# Patient Record
Sex: FEMALE | Race: WHITE
[De-identification: names, ages, dates, MRNs, and addresses within clinical notes are randomized per-mention and may not be internally consistent; named-entity substitution may affect disease eponyms.]

---

## 2017-11-15 ENCOUNTER — HOSPITAL ENCOUNTER (OUTPATIENT)
Dept: HOSPITAL 75 - PREOP | Age: 42
Discharge: HOME | End: 2017-11-15
Attending: PODIATRIST
Payer: COMMERCIAL

## 2017-11-15 VITALS — BODY MASS INDEX: 43.6 KG/M2 | HEIGHT: 62.5 IN | WEIGHT: 243 LBS

## 2017-11-15 VITALS — SYSTOLIC BLOOD PRESSURE: 123 MMHG | DIASTOLIC BLOOD PRESSURE: 84 MMHG

## 2017-11-15 DIAGNOSIS — M72.2: ICD-10-CM

## 2017-11-15 DIAGNOSIS — Z01.818: Primary | ICD-10-CM

## 2017-11-15 PROCEDURE — 87081 CULTURE SCREEN ONLY: CPT

## 2017-11-15 NOTE — HISTORY AND PHYSICAL
DATE OF SERVICE:  



DATE OF ADMISSION:

11/17/2017.



CHIEF COMPLAINT:

"Have right foot pain, I have plantar fasciitis, want it corrected, Dr. Mejia

to do it this Friday."



ALLERGIC TO MEDICATIONS:

SULFA AND ____.



MEDICATIONS:

Now on has a list, Prilosec, ibuprofen, daily fiber, vitamin E, melatonin, baby

aspirin, bupropion SR, Celexa 20 mg, breathing treatments, ipratropium bromide

and albuterol sulfate, albuterol inhaler p.r.n., gabapentin 300 mg a day,

Augmentin for a bite, animal.



PAST SURGICAL HISTORY:

Open heart, holes in it; complete hysterectomy.  The patient states she saw 

____ last Thursday at [place], [place] and told it was

okay to have surgery.



FAMILY HISTORY:

Dad, diabetes and heart disease.  Denies asthma, TB, lung disease or cancer.



REVIEW OF SYSTEMS:

HEAD:  Denies headache, dizziness.

EYES, EARS, NOSE AND THROAT:  Denies diplopia, tinnitus, sore throat.

RESPIRATORY:  The patient states she has asthma.  The patient states she never

smoked.  "I get a cough."  Denies short of breath or wheezing at this moment.

HEART:  No history of heart problems or chest pain.  Just checked out by

cardiologist last week.

GASTROINTESTINAL:  Appetite good.  Denies blood in the stools, diarrhea,

constipation, nausea or vomiting.

GENITOURINARY:  Denies blood, pain or frequency.



PHYSICAL EXAMINATION:

GENERAL:  The patient is a white female, well nourished, well developed, in no

acute respiratory distress at rest.

VITAL SIGNS:  Weight 241, blood pressure 120/80, pulse 84.

EARS:  No drainage noted.

EYES:  No conjunctivitis or icterus.

THROAT:  Noninflamed.  Tonsils removed.

NECK:  Thyroid not enlarged.  No abnormal cervical lymphadenopathy noted.

HEART:  Regular rate and rhythm.

LUNGS:  Clear to auscultation.

ABDOMEN:  Soft.  Liver and spleen nonpalpable.

LOWER EXTREMITIES:  Good dorsalis pedis pulses.



The patient is okay to have surgery, will be on standby if has any problems.





Job ID: [id number]

DocumentID: [id number]

Dictated Date:  11/15/2017 15:18:15

Transcription Date: 11/15/2017 16:43:22

Dictated By: BRANDY ALEXANDRE DO

## 2017-11-17 ENCOUNTER — HOSPITAL ENCOUNTER (OUTPATIENT)
Dept: HOSPITAL 75 - SDC | Age: 42
Discharge: HOME | End: 2017-11-17
Attending: PODIATRIST
Payer: COMMERCIAL

## 2017-11-17 VITALS — SYSTOLIC BLOOD PRESSURE: 111 MMHG | DIASTOLIC BLOOD PRESSURE: 75 MMHG

## 2017-11-17 VITALS — SYSTOLIC BLOOD PRESSURE: 108 MMHG | DIASTOLIC BLOOD PRESSURE: 74 MMHG

## 2017-11-17 VITALS — WEIGHT: 243 LBS | HEIGHT: 62.5 IN | BODY MASS INDEX: 43.6 KG/M2

## 2017-11-17 VITALS — DIASTOLIC BLOOD PRESSURE: 75 MMHG | SYSTOLIC BLOOD PRESSURE: 113 MMHG

## 2017-11-17 VITALS — DIASTOLIC BLOOD PRESSURE: 74 MMHG | SYSTOLIC BLOOD PRESSURE: 108 MMHG

## 2017-11-17 VITALS — SYSTOLIC BLOOD PRESSURE: 125 MMHG | DIASTOLIC BLOOD PRESSURE: 84 MMHG

## 2017-11-17 DIAGNOSIS — J45.909: ICD-10-CM

## 2017-11-17 DIAGNOSIS — I10: ICD-10-CM

## 2017-11-17 DIAGNOSIS — M41.9: ICD-10-CM

## 2017-11-17 DIAGNOSIS — K21.9: ICD-10-CM

## 2017-11-17 DIAGNOSIS — E66.01: ICD-10-CM

## 2017-11-17 DIAGNOSIS — E78.2: ICD-10-CM

## 2017-11-17 DIAGNOSIS — Z79.899: ICD-10-CM

## 2017-11-17 DIAGNOSIS — M72.2: Primary | ICD-10-CM

## 2017-11-17 NOTE — DISCHARGE INSTRUCTIONS
Discharge Instructions


Discharge Medications


New, Converted or Re-Newed RX:  RX Given to Pt/Family





Patient Instructions


Patient Instructions


1. Follow up in office in 2 weeks.





2. Diet as tolerated.





3. Activity as tolerated.





Activity & Diet


Activity as Tolerated:  Yes











BRANDY KAY DPM Nov 17, 2017 8:40 am

## 2017-11-17 NOTE — OPERATIVE REPORT
DATE OF SERVICE:  11/17/2017



PREOPERATIVE DIAGNOSIS:

Plantar fasciitis, right foot.



POSTOPERATIVE DIAGNOSIS:

Plantar fasciitis, right foot.



NAME OF OPERATION:

Endoscopic plantar fasciotomy, right foot.



DESCRIPTION OF OPERATION:

With the patient in the supine position having been ____ by general anesthetic,

sterile prep and drape were performed and a Jamie bandage was applied above the

level of the right ankle.  Appropriate measurements were taken and a 1 cm

incision was made in the medial aspect of the left heel.  This was deepened with

the sharp and blunt dissection.  Channeling instrument was then placed medially

to laterally through and through the left heel.  Trocar and cannula were placed

and the plantar fascia was identified.  A hook knife was then placed into the

cannula and the middle slip of the plantar fascia was released in toto.  The

plantar aspect of the right foot was thoroughly palpated.  At this time, it was

appreciated.  There had been total release of the plantar fascia.  The cannula

was removed.  The incisions were closed medially and laterally with one

horizontal mattress suture of 4-0 Prolene.  The surgical site was infiltrated

with 5 mL of 50:50 mixture of 0.5% Marcaine plain, 1% Carbocaine plain and 10 mg

of Decadron.  Following removal of the tourniquet, Adaptic with sterile

corrective compressive dressing were applied, carried above the level of the

right ankle, covered with circular Coban.  The patient tolerated the procedure

well with minimal blood loss and left the OR to the PAR in apparent good

condition.  She is to be seen in the office in 2 weeks for appropriate followup

care.





Job ID: [id number]

DocumentID: [id number]

Dictated Date:  11/17/2017 08:48:54

Transcription Date: 11/17/2017 12:19:27

Dictated By: BRANDY KAY DPM

## 2017-11-17 NOTE — PHYSICAL THERAPY ORTHO EVAL
PT Orthopedic Evaluation


Type of Surgery





Plantar Fascitis





Prior Level of Function


Current Living Status:  Significant Other


Locomotion     (Upon Admit):  Independent


Established Durable Medical Eq:  Crutches


Patient owns crutches and has used them in the past.





Motor Control


Motor Control:  Motor Control WNL





ROM


R foot currently limited due to s/p plantar fascitis





Strength


Strength:  WFL





Transfer


Transfers (B, C, W/C) (FIM):  6





Gait


Gait Assistive Device:  Crutches


Patient ambulates WBAT with crutches. She is instructed on proper ambulation 

with crutches


Right Lower Extremity:  Right


Weight Bearing Status RLE:  Weight Bearing/Tolerated


Left Lower Extremity:  Left


Weight Bearing Status LLE:  Full Weight Bearing


Gait (FIM):  1


Distance (FIM):  1=up to 49 ft


Distance:  25'


Summary/Comments


patient demonstrated knowledge of proper ambulation with crutches





Treatment Rendered


Treatment:  Gait Train





Assessment/Goals


Goal Time Frame:  1 Visit


Understands HEP:  Yes


Safe Ambulation:  Yes





Plan


Treatment Plan:  Discharge


PT/Family Agrees to Plan:  Yes





Time


Time In:  1015


Time Out:  1025


Total Billed Treatment Time:  10


Billed Treatment Time


1 visit


EVL 10 min











MARIA T VAZQUEZ PT Nov 17, 2017 10:45

## 2017-11-17 NOTE — PROGRESS NOTE-POST OPERATIVE
Post-Operative Progess Note


Surgeon (s)/Assistant (s)


Surgeon


BRANDY KAY DPM


Assistant:  none





Pre-Operative Diagnosis


plantar fascitis right foot.





Post-Operative Diagnosis





same





Procedure & Operative Findings


Date of Procedure


11/17/17


Procedure Performed/Findings


endoscopic plantar fasciotomy ribght foot


Anesthesia Type


general with infiltration





Estimated Blood Loss


Estimated blood loss (mL):  min





Specimens/Packing


Specimens Removed


none











BRANDY KAY DPM Nov 17, 2017 8:38 am

## 2017-11-17 NOTE — DISCHARGE INSTRUCTIONS
Discharge Instructions


Discharge Medications


New, Converted or Re-Newed RX:  RX Given to Pt/Family





Patient Instructions


Patient Instructions


1. Follow up in office in 2 weeks.





2. Diet as tolerated.





3. Activity as tolerated.





Activity & Diet


Activity as Tolerated:  Yes











BRANDY KAY DPM Nov 17, 2017 8:38 am

## 2017-11-17 NOTE — PROGRESS NOTE-PRE OPERATIVE
Pre-Operative Progress Note


H&P Reviewed


The H&P was reviewed, patient examined and no changes noted.


Date Seen by Provider:  Nov 17, 2017


Time Seen by Provider:  07:30


Date H&P Reviewed:  Nov 17, 2017


Time H&P Reviewed:  07:30


Pre-Operative Diagnosis:  plantar fascitis right foot.











BRANDY KAY DPM Nov 17, 2017 7:41 am

## 2020-03-03 ENCOUNTER — HOSPITAL ENCOUNTER (EMERGENCY)
Dept: HOSPITAL 75 - ER FS | Age: 45
Discharge: ANOTHER HOSPITAL | End: 2020-03-03
Payer: COMMERCIAL

## 2020-03-03 VITALS — HEIGHT: 61.81 IN | BODY MASS INDEX: 36.8 KG/M2 | WEIGHT: 199.96 LBS

## 2020-03-03 VITALS — SYSTOLIC BLOOD PRESSURE: 132 MMHG | DIASTOLIC BLOOD PRESSURE: 85 MMHG

## 2020-03-03 DIAGNOSIS — E11.9: ICD-10-CM

## 2020-03-03 DIAGNOSIS — S01.81XA: Primary | ICD-10-CM
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
## 2020-03-03 DIAGNOSIS — S01.85XA: ICD-10-CM

## 2020-03-03 DIAGNOSIS — S11.91XA: ICD-10-CM

## 2020-03-03 DIAGNOSIS — Z88.2: ICD-10-CM

## 2020-03-03 DIAGNOSIS — R74.0: ICD-10-CM

## 2020-03-03 DIAGNOSIS — Z88.8: ICD-10-CM

## 2020-03-03 DIAGNOSIS — J45.909: ICD-10-CM

## 2020-03-03 DIAGNOSIS — Z79.82: ICD-10-CM

## 2020-03-03 DIAGNOSIS — F32.9: ICD-10-CM

## 2020-03-03 LAB
ALBUMIN SERPL-MCNC: 4.5 GM/DL (ref 3.2–4.5)
ALP SERPL-CCNC: 160 U/L (ref 40–136)
ALT SERPL-CCNC: 94 U/L (ref 0–55)
APTT BLD: 22 SEC (ref 24–35)
BASOPHILS # BLD AUTO: 0 10^3/UL (ref 0–0.1)
BASOPHILS NFR BLD AUTO: 0 % (ref 0–10)
BILIRUB SERPL-MCNC: 0.7 MG/DL (ref 0.1–1)
BUN/CREAT SERPL: 19
CALCIUM SERPL-MCNC: 9.3 MG/DL (ref 8.5–10.1)
CHLORIDE SERPL-SCNC: 95 MMOL/L (ref 98–107)
CO2 SERPL-SCNC: 22 MMOL/L (ref 21–32)
CREAT SERPL-MCNC: 0.53 MG/DL (ref 0.6–1.3)
EOSINOPHIL # BLD AUTO: 0.2 10^3/UL (ref 0–0.3)
EOSINOPHIL NFR BLD AUTO: 5 % (ref 0–10)
ERYTHROCYTE [DISTWIDTH] IN BLOOD BY AUTOMATED COUNT: 12.1 % (ref 10–14.5)
GFR SERPLBLD BASED ON 1.73 SQ M-ARVRAT: > 60 ML/MIN
GLUCOSE SERPL-MCNC: 475 MG/DL (ref 70–105)
HCT VFR BLD CALC: 39 % (ref 35–52)
HGB BLD-MCNC: 14.6 G/DL (ref 11.5–16)
INR PPP: 0.9 (ref 0.8–1.4)
LYMPHOCYTES # BLD AUTO: 1.9 X 10^3 (ref 1–4)
LYMPHOCYTES NFR BLD AUTO: 41 % (ref 12–44)
MANUAL DIFFERENTIAL PERFORMED BLD QL: NO
MCH RBC QN AUTO: 31 PG (ref 25–34)
MCHC RBC AUTO-ENTMCNC: 37 G/DL (ref 32–36)
MCV RBC AUTO: 83 FL (ref 80–99)
MONOCYTES # BLD AUTO: 0.4 X 10^3 (ref 0–1)
MONOCYTES NFR BLD AUTO: 9 % (ref 0–12)
NEUTROPHILS # BLD AUTO: 2.1 X 10^3 (ref 1.8–7.8)
NEUTROPHILS NFR BLD AUTO: 44 % (ref 42–75)
PLATELET # BLD: 242 10^3/UL (ref 130–400)
PMV BLD AUTO: 10.1 FL (ref 7.4–10.4)
POTASSIUM SERPL-SCNC: 4 MMOL/L (ref 3.6–5)
PROT SERPL-MCNC: 7 GM/DL (ref 6.4–8.2)
PROTHROMBIN TIME: 12.2 SEC (ref 12.2–14.7)
SODIUM SERPL-SCNC: 133 MMOL/L (ref 135–145)
WBC # BLD AUTO: 4.6 10^3/UL (ref 4.3–11)

## 2020-03-03 PROCEDURE — 85730 THROMBOPLASTIN TIME PARTIAL: CPT

## 2020-03-03 PROCEDURE — 12054 INTMD RPR FACE/MM 7.6-12.5CM: CPT

## 2020-03-03 PROCEDURE — 82962 GLUCOSE BLOOD TEST: CPT

## 2020-03-03 PROCEDURE — 80053 COMPREHEN METABOLIC PANEL: CPT

## 2020-03-03 PROCEDURE — 85610 PROTHROMBIN TIME: CPT

## 2020-03-03 PROCEDURE — 85025 COMPLETE CBC W/AUTO DIFF WBC: CPT

## 2020-03-03 PROCEDURE — 36415 COLL VENOUS BLD VENIPUNCTURE: CPT

## 2020-03-03 PROCEDURE — 70498 CT ANGIOGRAPHY NECK: CPT

## 2020-03-03 NOTE — DIAGNOSTIC IMAGING REPORT
INDICATION: Animal bite in the left neck with bleeding.



CT imaging of the neck is performed after bolus intravenous

administration of iodinated contrast. There is rather extensive

gas within the tissues of the left neck with diffuse edema,

inflammation and hemorrhage. There is an approximately 1.9 x 1.9

x 2.9 cm hyperdense focus which likely represents hematoma just

lateral to the mandibular angle. There is no evidence of injury

to the great vessels of the neck. No active contrast

extravasation is identified. There may be mild edema or

hemorrhage in the left parotid salivary gland. Parotid glands

otherwise have low density indicating fatty infiltration. No

submandibular hematoma is identified. There is small amount of

gas involving the left submandibular salivary gland. No airway

encroachment is identified. Thyroid gland is unremarkable in

appearance. Note is made of focal lucency in the left

cerebellopontine angle which could be secondary to lipoma or

epidermoid. There is no evidence of acute osseous abnormality.



IMPRESSION: Contusion and hematoma in the left neck with largest

hematoma lateral to the left mandibular angle. There may be mild

involvement of the left parotid and submandibular salivary glands

with significant gas within the soft tissues. There is no

evidence of acute osseous abnormality and no great vessel injury

is identified.



Dictated by: 



  Dictated on workstation # [id number]

## 2020-03-03 NOTE — ED GENERAL
General


Chief Complaint:  Bite-Animal/Human/Insect


Stated Complaint:  ANIMAL BITE IN CHEEK


Nursing Triage Note:  


ARRIVED VIA AMB TO ER HOLDING LEFT SIDE OF NECK WITH A BLOODY RAG.  PT STATES 


ONE OF HER EXOTIC ANIMALS BIT HER ET STATES THE ANIMAL HAS LONG TEETH. PT STATES




IF SHE TAKES THE RAGS OFF THE BLOOD WILL SQUIRT.   PT DOES NOT WANT TO STATE THE




TYPE OF ANIMAL.  PT


Nursing Sepsis Screen:  No Definite Risk





History of Present Illness


Date Seen by Provider:  Mar 3, 2020


Time Seen by Provider:  16:23


Initial Comments


Patient presenting to emergency department for evaluation of animal bite wound 

to her left face and neck.  She would not initially commit to what animal was 

other than saying it was an exotic animal.  She then said that it was a 

[place].  She explained that it is in the possum family and it is intelligent 

and is in its sexual phase and became more aggressive and appears to have bit 

her several times.  She said her wound was bleeding profusely initially and her 

wound does continue to bleed on arrival here in the emergency department but 

there does not appear to be any arterial bleeding.  I asked her how long the 

teeth were and she estimated approximately 2-3 inches.  She says her tetanus and

rabies vaccinations are up-to-date and the animals vaccinations are up-to-date 

as well.  She denies any other injuries and appears uncomfortable but is 

nontoxic.





Allergies and Home Medications


Allergies


Coded Allergies:  


     Sulfa (Sulfonamide Antibiotics) (Verified  Allergy, Unknown, RASH, 

11/15/17)


     fluticasone furoate (Verified  Allergy, Unknown, ANAPHYLAXIS, 11/15/17)


     vilanterol (Verified  Allergy, Unknown, ANAPHYLAXIS, 11/15/17)





Home Medications


Albuterol Sulfate 1 Puff Puff, 2 PUFF IH Q4H PRN for WHEEZING, (Reported)


   1 PUFF = 90 MCG 


Amoxicillin/Potassium Clav 1 Each Tablet, 1 EACH PO BID, (Reported)


Aspirin 81 Mg Tablet.dr, 81 MG PO HS, (Reported)


Bupropion HCl 100 Mg Tablet.er, 100 MG PO HS, (Reported)


Escitalopram Oxalate 20 Mg Tablet, 20 MG PO HS, (Reported)


Gabapentin 300 Mg Capsule, 300 MG PO TID, (Reported)


Hydrocodone/Acetaminophen 1 Each Tablet, 1 TAB PO Q4-6 PRN for PAIN


   Prescribed by: MICHELLE HEARN on 11/17/17 0935


Ibuprofen 200 Mg Tablet, 600 MG PO HS, (Reported)


   take 3 (200mg) tabs 


Ipratropium/Albuterol Sulfate 3 Ml Ampul.neb, 3 ML IH Q4H PRN for SHORTNESS OF 

BREATH, (Reported)


Melatonin/Pyridoxine 1 Each Tablet, 5 MG PO HS, (Reported)


Omeprazole Magnesium 20 Mg Tablet.dr, 20 MG PO DAILY, (Reported)


Psyllium Husk 0.52 Gm Capsule, 0.52 GM PO HS, (Reported)


Vitamin E Mixed 1,000 Unit Capsule, 1,000 UNIT PO HS, (Reported)





Patient Home Medication List


Home Medication List Reviewed:  Yes





Review of Systems


Review of Systems


Constitutional:  no symptoms reported


EENTM:  no symptoms reported


Respiratory:  no symptoms reported


Cardiovascular:  no symptoms reported


Gastrointestinal:  nausea


Musculoskeletal:  no symptoms reported


Skin:  see HPI





All Other Systems Reviewed


Negative Unless Noted:  Yes





Past Medical-Social-Family Hx


Patient Social History


Alcohol Use:  Denies Use


Recreational Drug Use:  No


Smoking Status:  Never a Smoker


Recent Foreign Travel:  No


Contact w/Someone Who Travel:  No


Recent Infectious Disease Expo:  No


Recent Hopitalizations:  No





Immunizations Up To Date


Tetanus Booster (TDap):  Unknown





Seasonal Allergies


Seasonal Allergies:  Yes





Past Medical History


Surgeries:  Yes (OPEN HEART SX FOR HOLES IN HEART AGE 27, BREAST LUMPECTOMY, R 

BUNIONECTOMY )


Hysterectomy


Respiratory:  Yes


Asthma


Cardiac:  Yes (HX OF HOLES IN HEART REPAIRED WHEN 27 YRS OLD, NO ISSUE NOW)


Neurological:  No


GYN History:  Hysterectomy


Gastrointestinal:  No


Musculoskeletal:  Yes (RIGHT FOOT PLANTAR FASCITIS)


Endocrine:  No


Loss of Vision:  Denies


Hearing Impairment:  Denies


Cancer:  No


Psychosocial:  Yes


Depression


Integumentary:  Yes (ANIMAL BITE TO RT KNEE)


Blood Disorders:  No





Physical Exam


Vital Signs





Vital Signs - First Documented








 3/3/20





 15:15


 


Temp 37.0


 


Pulse 107


 


Resp 16


 


B/P (MAP) 152/105 (121)


 


Pulse Ox 98


 


O2 Delivery Room Air





Capillary Refill : Less Than 3 Seconds


Height, Weight, BMI


Height: 5'5.00"


Weight: 200lbs. 0.0oz. 90.932354kf; 36.00 BMI


Method:Stated


General Appearance:  No Apparent Distress, WD/WN


HEENT:  PERRL/EOMI


Neck:  Supple


Respiratory:  Lungs Clear, No Respiratory Distress


Cardiovascular:  Regular Rate, Rhythm


Gastrointestinal:  Non Tender, Soft


Back:  Normal Inspection


Extremity:  Normal Capillary Refill


Neurologic/Psychiatric:  Alert, Oriented x3


Skin:  Other (Large deep laceration to left face and neck.  Active bleeding with

unclear source.  There are 4 lacs total.  Largest is appx 6cm, then there is a 

2cm, and 2 1cm lacs above the large lac.)





Procedures/Interventions





   Wound Location:  Face, Neck


   Wound Length (cm):  10


   Wound's Depth, Shape:  into muscle, irregular, contused tissue, sub Q


   Wound Explored:  no foreign body removed


   Irrigated w/ Saline (ccs):  500


   Betadine Prep?:  No


   Anesthesia:  Lidocaine w/ Epi


   Volume Anesthetic (ccs):  20


   Wound Debrided:  moderate


   Suture:  Monocryl


   Suture Size:  4-0


   Number of Sutures:  15


   Layer Closure?:  1


   Sterile Dressing Applied?:  Yes


Progress


Patient prepped and draped in normal sterile fashion.  Approximately 10 cc of 

lidocaine with epinephrine were used initially and then another 10 cc were 

needed later on for the additional lacerations.  Wound cleansed with 

chlorhexidine soap and irrigated with 500 cc of saline.  There was moderate 

amount of active bleeding initially and pressure was held in between the times I

was placing sutures.  Wound continued to use until he put in approximately 10 

sutures in the large complex laceration and then 5 additional sutures were used 

for the other lacerations.





Progress/Results/Core Measures


Suspected Sepsis


Recent Fever Within 48 Hours:  No


Infection Criteria Present:  None


New/Unexplained  Altered Menta:  No


Sepsis Screen:  No Definite Risk


SIRS


Temperature: 


Pulse: 107 


Respiratory Rate: 16


 


Laboratory Tests


3/3/20 15:30: White Blood Count 4.6


Blood Pressure 152 /105 


Mean: 121


 


Laboratory Tests


3/3/20 15:30: 


Creatinine 0.53L, INR Comment 0.9, Platelet Count 242, Total Bilirubin 0.7








Results/Orders


Lab Results





Laboratory Tests








Test


 3/3/20


15:30 Range/Units


 


 


White Blood Count


 4.6 


 4.3-11.0


10^3/uL


 


Red Blood Count


 4.76 


 4.35-5.85


10^6/uL


 


Hemoglobin 14.6  11.5-16.0  G/DL


 


Hematocrit 39  35-52  %


 


Mean Corpuscular Volume 83  80-99  FL


 


Mean Corpuscular Hemoglobin 31  25-34  PG


 


Mean Corpuscular Hemoglobin


Concent 37 H


 32-36  G/DL





 


Red Cell Distribution Width 12.1  10.0-14.5  %


 


Platelet Count


 242 


 130-400


10^3/uL


 


Mean Platelet Volume 10.1  7.4-10.4  FL


 


Neutrophils (%) (Auto) 44  42-75  %


 


Lymphocytes (%) (Auto) 41  12-44  %


 


Monocytes (%) (Auto) 9  0-12  %


 


Eosinophils (%) (Auto) 5  0-10  %


 


Basophils (%) (Auto) 0  0-10  %


 


Neutrophils # (Auto) 2.1  1.8-7.8  X 10^3


 


Lymphocytes # (Auto) 1.9  1.0-4.0  X 10^3


 


Monocytes # (Auto) 0.4  0.0-1.0  X 10^3


 


Eosinophils # (Auto)


 0.2 


 0.0-0.3


10^3/uL


 


Basophils # (Auto)


 0.0 


 0.0-0.1


10^3/uL


 


Prothrombin Time 12.2  12.2-14.7  SEC


 


INR Comment 0.9  0.8-1.4  


 


Activated Partial


Thromboplast Time 22 L


 24-35  SEC





 


Sodium Level 133 L 135-145  MMOL/L


 


Potassium Level 4.0  3.6-5.0  MMOL/L


 


Chloride Level 95 L   MMOL/L


 


Carbon Dioxide Level 22  21-32  MMOL/L


 


Anion Gap 16 H 5-14  MMOL/L


 


Blood Urea Nitrogen 10  7-18  MG/DL


 


Creatinine


 0.53 L


 0.60-1.30


MG/DL


 


Estimat Glomerular Filtration


Rate > 60 


  





 


BUN/Creatinine Ratio 19   


 


Glucose Level 475 *H   MG/DL


 


Calcium Level 9.3  8.5-10.1  MG/DL


 


Corrected Calcium 8.9  8.5-10.1  MG/DL


 


Total Bilirubin 0.7  0.1-1.0  MG/DL


 


Aspartate Amino Transf


(AST/SGOT) 60 H


 5-34  U/L





 


Alanine Aminotransferase


(ALT/SGPT) 94 H


 0-55  U/L





 


Alkaline Phosphatase 160 H   U/L


 


Total Protein 7.0  6.4-8.2  GM/DL


 


Albumin 4.5  3.2-4.5  GM/DL








My Orders





Orders - DAVION WEINSTEIN DO


Lidocaine/Epi 2% 1:100,000 (Xylocaine/Ep (3/3/20 15:18)


Ondansetron Injection (Zofran Injectio (3/3/20 15:28)


Cbc With Automated Diff (3/3/20 15:39)


Comprehensive Metabolic Panel (3/3/20 15:39)


Partial Thromboplastin Time (3/3/20 15:39)


Protime With Inr (3/3/20 15:39)


Ct Angio Neck W (3/3/20 15:39)


Lidocaine/Epi 2% 1:100,000 (Xylocaine/Ep (3/3/20 15:45)


Iohexol Injection (Omnipaque 350 Mg/Ml 1 (3/3/20 16:00)


Received Contrast (Hold Metformin- Contr (3/3/20 16:00)


Ns (Ivpb) (Sodium Chloride 0.9% Ivpb Bag (3/3/20 16:00)


Sodium Chloride Flush (Catheter Flush Sy (3/3/20 16:00)


Fentanyl  Injection (Sublimaze Injection (3/3/20 16:15)


Promethazine Injection (Phenergan Injec (3/3/20 16:15)


Lidocaine/Epi 2% 1:100,000 (Xylocaine/Ep (3/3/20 16:15)


Lidocaine/Epi 2% 1:100,000 (Xylocaine/Ep (3/3/20 16:15)


Cefazolin 2 Gm/50 Ml Ns (Ancef 2 Gm/50 M (3/3/20 16:30)


Ns Iv 1000 Ml (Sodium Chloride 0.9%) (3/3/20 16:30)


Insulin (Regular) Human (Humulin R (Per (3/3/20 16:30)


Cefazolin  Injection (Ancef  Injection) (3/3/20 17:30)


Cefazolin  Injection (Ancef  Injection) (3/3/20 17:30)





Medications Given in ED





Current Medications








 Medications  Dose


 Ordered  Sig/Rukhsana


 Route  Start Time


 Stop Time Status Last Admin


Dose Admin


 


 Cefazolin Sodium


 1000 mg/Sterile


 Water  10 ml @ 


 200 mls/hr  ONCE  ONCE


 IV  3/3/20 17:30


 3/3/20 17:32 DC 3/3/20 17:52


200 MLS/HR


 


 Fentanyl Citrate  50 mcg  Q1H  PRN


 IVP  3/3/20 16:15


    3/3/20 16:07


50 MCG


 


 Insulin Human


 Regular  10 unit  ONCE  ONCE


 IV  3/3/20 16:30


 3/3/20 16:31 DC 3/3/20 17:11


10 UNIT


 


 Iohexol  100 ml  ONCE  ONCE


 IV  3/3/20 16:00


 3/3/20 16:01 DC 3/3/20 16:36


75 ML


 


 Lidocaine/


 Epinephrine  20 ml  ONCE  ONCE


 INJ  3/3/20 16:15


 3/3/20 16:16 DC 3/3/20 16:11


20 ML


 


 Ondansetron HCl  4 mg  STK-MED ONCE


 .ROUTE  3/3/20 15:28


 3/3/20 15:34 DC 3/3/20 15:37


8 MG


 


 Promethazine HCl  12.5 mg  ONCE  ONCE


 IVP  3/3/20 16:15


 3/3/20 16:16 DC 3/3/20 16:07


12.5 MG


 


 Sodium Chloride  10 ml  AS NEEDED  PRN


 IV  3/3/20 16:00


    3/3/20 16:36


10 ML


 


 Sodium Chloride  100 ml  ONCE  ONCE


 IV  3/3/20 16:00


 3/3/20 16:01 DC 3/3/20 16:36


80 ML








Vital Signs/I&O











 3/3/20





 15:15


 


Temp 37.0


 


Pulse 107


 


Resp 16


 


B/P (MAP) 152/105 (121)


 


Pulse Ox 98


 


O2 Delivery Room Air





Capillary Refill : Less Than 3 Seconds








Blood Pressure Mean:                    121








Progress Note :  


Progress Note


Patient has a large left complex facial laceration.  CT imaging showed no damage

to vascular structures but she does have a large hematoma in addition to 

possible glandular structure damage.  I spoke to the trauma surgeon at 

[facility] Dr. Keisha Moore and he said that he could wash out the wound 

and keep her on antibiotics but they have no ENT or plastics available and he 

thinks that given there is possible glandular involvement he would prefer her to

be sent to a facility with plastics and ENT.  I spoke to [facility] and asked if I could speak to the plastic surgeon as she could 

potentially follow-up as an outpatient but they said that they require her to be

transferred and evaluated at the trauma center and then will make further 

disposition planning including possible further surgery.  She was given 

medications for pain and nausea in addition to Ancef.  Patient will be 

transferred in stable condition via EMS.





Departure


Impression





   Primary Impression:  


   Bite by animal


   Additional Impressions:  


   Complex laceration of face


   Transaminitis


   Diabetes mellitus, new onset


Disposition:  02 XFER SHT-TRM HOSP


Condition:  Stable





Transfer


Transfer Reason:  Exceeds level of care


Time Spoke to Accepting Phy:  17:30


Transfer Facility:  


[facility]


Method of Transfer:  EMS





Departure-Patient Inst.


Referrals:  


RENEA HOLLIDAY MD (PCP/Family)


Primary Care Physician











DAVION WEINSTEIN DO              Mar 3, 2020 16:27

## 2020-07-30 ENCOUNTER — HOSPITAL ENCOUNTER (EMERGENCY)
Dept: HOSPITAL 75 - ER FS | Age: 45
Discharge: HOME | End: 2020-07-30
Payer: COMMERCIAL

## 2020-07-30 VITALS — DIASTOLIC BLOOD PRESSURE: 90 MMHG | SYSTOLIC BLOOD PRESSURE: 134 MMHG

## 2020-07-30 VITALS — HEIGHT: 62.01 IN | WEIGHT: 210.98 LBS | BODY MASS INDEX: 38.34 KG/M2

## 2020-07-30 DIAGNOSIS — J45.909: ICD-10-CM

## 2020-07-30 DIAGNOSIS — Z88.2: ICD-10-CM

## 2020-07-30 DIAGNOSIS — F32.9: ICD-10-CM

## 2020-07-30 DIAGNOSIS — T63.461A: Primary | ICD-10-CM

## 2020-07-30 DIAGNOSIS — Z79.82: ICD-10-CM

## 2020-07-30 DIAGNOSIS — Z88.8: ICD-10-CM

## 2020-07-30 PROCEDURE — 93041 RHYTHM ECG TRACING: CPT

## 2020-07-30 NOTE — XMS REPORT
Continuity of Care Document

                             Created on: 2020



Thu Ward

External Reference #: [id number]

: 1975

Sex: Female



Demographics





                          Address                   [address]  [address]

 

                          Home Phone                [phone] x

 

                          Preferred Language        Unknown

 

                          Marital Status            Unknown

 

                          Voodoo Affiliation     Unknown

 

                          Race                      Unknown

 

                          Ethnic Group              Unknown





Author





                          Organization              Unknown

 

                          Address                   Unknown

 

                          Phone                     Unavailable



              



Allergies

      



             Active           Description           Code           Type         

  Severity   

                Reaction           Onset           Reported/Identified          

 

Relationship to Patient                 Clinical Status        

 

                Yes             fluticasone furoate           [id number]        

   Drug Allergy   

             Unknown           ANAPHYLAXIS                        11/15/2017    

       

                                                 

 

                Yes             Sulfa (Sulfonamide Antibiotics)           [id number]

0491           

Drug Allergy           Unknown           RASH                            11/15/2

017  

                                                             

 

             Yes           vilanterol           [id number]           Drug Allerg

y           

Unknown           ANAPHYLAXIS                           11/15/2017              

    

                                                 



                      



Medications

      



There is no data.                  



Problems

      



             Date Dx Coded           Attending           Type           Code    

       

Diagnosis                               Diagnosed By        

 

                2017           BRANDY KAY DPM           Ot         

     E66.01        

                          MORBID (SEVERE) OBESITY DUE TO EXCESS CA              

      

 

                2017           BRANDY KAY DPM           Ot         

     E78.2         

                          MIXED HYPERLIPIDEMIA                    

 

             2017           BRANDY KAY DPM           Ot           I

10           

ESSENTIAL (PRIMARY) HYPERTENSION                    

 

                2017           BRANDY KAY DPM           Ot         

     J45.909       

                          UNSPECIFIED ASTHMA, UNCOMPLICATED                    

 

                2017           BRANDY KAY DPM           Ot         

     K21.9         

                          GASTRO-ESOPHAGEAL REFLUX DISEASE WITHOUT              

      

 

                2017           BRANDY KAY DPM           Ot         

     M41.9         

                          SCOLIOSIS, UNSPECIFIED                    

 

                2017           BRANDY KAY DPM           Ot         

     M72.2         

                          PLANTAR FASCIAL FIBROMATOSIS                    

 

                2017           BRANDY KAY DPM           Ot         

     Z68.41        

                          BODY MASS INDEX (BMI) 40.0-44.9, ADULT                

    

 

                2017           BRANDY KAY DPM           Ot         

     Z79.899       

                          OTHER LONG TERM (CURRENT) DRUG THERAPY                

    

 

                2017           BRANDY KAY DPM           Ot         

     E66.01        

                          MORBID (SEVERE) OBESITY DUE TO EXCESS CA              

      

 

                2017           BRANDY KAY DPM           Ot         

     E78.2         

                          MIXED HYPERLIPIDEMIA                    

 

             2017           BRANDY KAY DPM           Ot           I

10           

ESSENTIAL (PRIMARY) HYPERTENSION                    

 

                2017           BRANDY KAY DPM           Ot         

     J45.909       

                          UNSPECIFIED ASTHMA, UNCOMPLICATED                    

 

                2017           BRANDY KAY DPM           Ot         

     K21.9         

                          GASTRO-ESOPHAGEAL REFLUX DISEASE WITHOUT              

      

 

                2017           ELIZA DPM, BRANDY P           Ot         

     M41.9         

                          SCOLIOSIS, UNSPECIFIED                    

 

                2017           KAY DPM, BRANDY P           Ot         

     M72.2         

                          PLANTAR FASCIAL FIBROMATOSIS                    

 

                2017           ELIZA DPSHABANA, BRANDY P           Ot         

     Z68.41        

                          BODY MASS INDEX (BMI) 40.0-44.9, ADULT                

    

 

                2017           KAY DPM, BRANDY WESTFALL           Ot         

     Z79.899       

                          OTHER LONG TERM (CURRENT) DRUG THERAPY                

    

 

                2017           ELIZA DPM, BRANDY [place]STFALL           Ot         

     M72.2         

                          PLANTAR FASCIAL FIBROMATOSIS                    

 

                2017           ELIZA DPM, BRANDY WESTFALL           Ot         

     Z01.818       

                          ENCOUNTER FOR OTHER PREPROCEDURAL EXAMIN              

      

 

             2018           THU ROMANP           Ot           F32.9   

        MAJOR

DEPRESSIVE DISORDER, SINGLE EPISOD                    

 

             2018           JESSIE, THU ARNP           Ot           J45.909 

          

UNSPECIFIED ASTHMA, UNCOMPLICATED                    

 

             2018           JESSIE, THU ARNP           Ot           R42     

      

DIZZINESS AND GIDDINESS                          

 

             2018           JESSIE, THU ARNP           Ot           R55     

      SYNCOPE

AND COLLAPSE                                     

 

             2018           JESSIE THU ARNP           Ot           Z79.51  

         LONG

TERM (CURRENT) USE OF INHALED STERO                    

 

             2018           JESSIE THU ARNP           Ot           Z79.82  

         LONG

TERM (CURRENT) USE OF ASPIRIN                    

 

             2018           JESSIE THU ARNP           Ot           Z88.2   

        

ALLERGY STATUS TO SULFONAMIDES STATUS                    

 

             2018           JESSIE, THU ARNP           Ot           Z88.8   

        

ALLERGY STATUS TO OTH DRUG/MEDS/BIOL SUB                    

 

             2018           JESSIE THU ARNP           Ot           Z90.710 

          

ACQUIRED ABSENCE OF BOTH CERVIX AND UTER                    

 

             2018           THU ROMAN ARNP           Ot           F32.9   

        MAJOR

DEPRESSIVE DISORDER, SINGLE EPISOD                    

 

             2018           JESSIE, THU ARNP           Ot           J45.909 

          

UNSPECIFIED ASTHMA, UNCOMPLICATED                    

 

             2018           JESSIE THU ARNP           Ot           R42     

      

DIZZINESS AND GIDDINESS                          

 

             2018           JESSIE THU ARNP           Ot           R55     

      SYNCOPE

AND COLLAPSE                                     

 

             2018           JESSIE THU ARNP           Ot           Z79.51  

         LONG

TERM (CURRENT) USE OF INHALED STERO                    

 

             2018           JESSIE THU ARNP           Ot           Z79.82  

         LONG

TERM (CURRENT) USE OF ASPIRIN                    

 

             2018           JESSIE THU ARNP           Ot           Z88.2   

        

ALLERGY STATUS TO SULFONAMIDES STATUS                    

 

             2018           THU ROMAN ROULA           Ot           Z88.8   

        

ALLERGY STATUS TO OTH DRUG/MEDS/BIOL SUB                    

 

             2018           THU ROMAN ROULA           Ot           Z90.710 

          

ACQUIRED ABSENCE OF BOTH CERVIX AND UTER                    

 

             2020           DVAION WEINSTEIN DO           Ot           E11

.9           

TYPE 2 DIABETES MELLITUS WITHOUT COMPLIC                    

 

             2020           DAVION WEINSTEIN DO           Ot           F32

.9           

MAJOR DEPRESSIVE DISORDER, SINGLE EPISOD                    

 

                2020           DAVION WEINSTEIN DO, Ot           

   J45.909         

                          UNSPECIFIED ASTHMA, UNCOMPLICATED                    

 

             2020           DAVION WEINSTEIN DO           Ot           R74

.0           

NONSPEC ELEV OF LEVELS OF TRANSAMNS   LA                    

 

                2020           DAVION WEINSTEIN DO, Ot           

   S01.81XA        

                          LACERATION W/O FOREIGN BODY OF OTH PART               

      

 

                2020           DAVION WEINSTEIN DO, Ot           

   S01.85XA        

                          OPEN BITE OF OTHER PART OF HEAD, INITIAL              

      

 

                2020           DAVION WEINSTEIN DO, Ot           

   S11.91XA        

                          LACERATION W/O FOREIGN BODY OF UNSP PART              

      

 

                2020           DAVION WEINSTEIN DO, Ot           

   W55.81XA        

                          BITTEN BY OTHER MAMMALS, INITIAL ENCOUNT              

      

 

                2020           DAVION WEINSTEIN DO, Ot           

   Z79.82          

                          LONG TERM (CURRENT) USE OF ASPIRIN                    

 

             2020           DAVION WEINSTEIN DO, Ot           Z88

.2           

ALLERGY STATUS TO SULFONAMIDES STATUS                    

 

             2020           DAVION WEINSTEIN DO, Ot           Z88

.8           

ALLERGY STATUS TO OTH DRUG/MEDS/BIOL SUB                    



                                                                                
                                 



Procedures

      



There is no data.                  



Results

      



                    Test                Result              Range        

 

                                        Methicillin resistant Staphylococcus aur

eus (MRSA) screening culture - 11/15/17 

13:49         

 

                          Methicillin resistant Staphylococcus aureus (MRSA) scr

eening culture           

NEG                                     NRG        

 

                                        Complete blood count (CBC) with automate

d white blood cell (WBC) differential - 

18 12:42         

 

                          Blood leukocytes automated count (number/volume)      

     6.9 10*3/uL          

                                        4.3-11.0        

 

                          Blood erythrocytes automated count (number/volume)    

       4.57 10*6/uL       

                                        4.35-5.85        

 

                    Venous blood hemoglobin measurement (mass/volume)           

14.9 g/dL           

11.5-16.0        

 

                    Blood hematocrit (volume fraction)           39 %           

     35-52        

 

                    Automated erythrocyte mean corpuscular volume           85 [

foz_us]           

80-99        

 

                                        Automated erythrocyte mean corpuscular h

emoglobin (mass per erythrocyte)        

                          33 pg                     25-34        

 

                                        Automated erythrocyte mean corpuscular h

emoglobin concentration measurement 

(mass/volume)             38 g/dL                   32-36        

 

                    Automated erythrocyte distribution width ratio           12.

4 %              10.0-

14.5        

 

                    Automated blood platelet count (count/volume)           236 

10*3/uL           

130-400        

 

                          Automated blood platelet mean volume measurement      

     9.9 [foz_us]         

                                        7.4-10.4        

 

                    Automated blood neutrophils/100 leukocytes           72 %   

             42-75       

 

 

                    Automated blood lymphocytes/100 leukocytes           21 %   

             12-44       

 

 

                    Blood monocytes/100 leukocytes           7 %                

 0-12        

 

                    Automated blood eosinophils/100 leukocytes           0 %    

             0-10        

 

                    Automated blood basophils/100 leukocytes           0 %      

           0-10        

 

                    Blood neutrophils automated count (number/volume)           

5.0 10*3            

1.8-7.8        

 

                    Blood lymphocytes automated count (number/volume)           

1.5 10*3            

1.0-4.0        

 

                    Blood monocytes automated count (number/volume)           0.

5 10*3            

0.0-1.0        

 

                    Automated eosinophil count           0.0 10*3/uL           0

.0-0.3        

 

                    Automated blood basophil count (count/volume)           0.0 

10*3/uL           

0.0-0.1        

 

                                        Comprehensive metabolic panel - 18

 12:42         

 

                          Serum or plasma sodium measurement (moles/volume)     

      140 mmol/L          

                                        135-145        

 

                          Serum or plasma potassium measurement (moles/volume)  

         4.2 mmol/L       

                                        3.6-5.0        

 

                          Serum or plasma chloride measurement (moles/volume)   

        107 mmol/L        

                                                

 

                    Carbon dioxide           21 mmol/L           21-32        

 

                          Serum or plasma anion gap determination (moles/volume)

           12 mmol/L      

                                        5-14        

 

                          Serum or plasma urea nitrogen measurement (mass/volume

)           8 mg/dL       

                                        7-18        

 

                          Serum or plasma creatinine measurement (mass/volume)  

         0.67 mg/dL       

                                        0.60-1.30        

 

                    Serum or plasma urea nitrogen/creatinine mass ratio         

  12                  NRG 

       

 

                                        Serum or plasma creatinine measurement w

ith calculation of estimated glomerular 

filtration rate           >                         NRG        

 

                    Serum or plasma glucose measurement (mass/volume)           

137 mg/dL           

        

 

                          Serum or plasma calcium measurement (mass/volume)     

      10.0 mg/dL          

                                        8.5-10.1        

 

                          Serum or plasma total bilirubin measurement (mass/volu

me)           1.0 mg/dL   

                                        0.1-1.0        

 

                                        Serum or plasma alkaline phosphatase cory

surement (enzymatic activity/volume)    

                          92 U/L                            

 

                                        Serum or plasma aspartate aminotransfera

se measurement (enzymatic 

activity/volume)           50 U/L                    5-34        

 

                                        Serum or plasma alanine aminotransferase

 measurement (enzymatic activity/volume)

                          80 U/L                    0-55        

 

                    Serum or plasma protein measurement (mass/volume)           

7.4 g/dL            

6.4-8.2        

 

                    Serum or plasma albumin measurement (mass/volume)           

4.8 g/dL            

3.2-4.5        

 

                                        Magnesium - 18 12:42         

 

                    Magnesium           2.3 mg/dL           1.8-2.4        

 

                                        PT panel in platelet poor plasma by coag

ulation assay - 18 12:42         

 

                          Prothrombin time (PT) in platelet poor plasma by coagu

lation assay           

12.8 s                                  12.2-14.7        

 

                          INR in platelet poor plasma or blood by coagulation as

say           1.0         

                                        0.8-1.4        

 

                                        Activated partial thromboplastin time (a

PTT) in platelet poor plasma 

bycoagulation assay - 18 12:42         

 

                                        Activated partial thromboplastin time (a

PTT) in platelet poor plasma 

bycoagulation assay           27 s                      24-35        

 

                                        Serum or plasma troponin i.cardiac measu

rement (mass/volume) - 18 12:42   

      

 

                          Serum or plasma troponin i.cardiac measurement (mass/v

olume)           < ng/mL  

                                        <0.30        

 

                                        Myoglobin, serum - 18 12:42       

  

 

                    Myoglobin, serum           21.1 ng/mL           10.0-92.0   

     

 

                                        Urine drug screening test - 18 14:

18         

 

                    Urine phencyclidine detection by screening method           

NEGATIVE            

NEGATIVE        

 

                          Urine benzodiazepines detection by screening method   

        NEGATIVE          

                                        NEGATIVE        

 

                    Urine cocaine detection           NEGATIVE            NEGATI

VE        

 

                    Urine amphetamines detection by screening method           N

EGATIVE            

NEGATIVE        

 

                          Urine methamphetamine detection by screening method   

        NEGATIVE          

                                        NEGATIVE        

 

                    Urine cannabinoids detection by screening method           N

EGATIVE            

NEGATIVE        

 

                    Urine opiates detection by screening method           NEGATI

VE            

NEGATIVE        

 

                    Urine barbiturates detection           NEGATIVE            N

EGATIVE        

 

                          Screening urine tricyclic antidepressants detection   

        NEGATIVE          

                                        NEGATIVE        

 

                    Urine methadone detection by screening method           NEGA

TIVE            

NEGATIVE        

 

                    Urine oxycodone detection           NEGATIVE            NEGA

TIVE        

 

                    Urine propoxyphene detection           NEGATIVE            N

EGATIVE        

 

                                        Complete urinalysis with reflex to cultu

re - 18 14:18         

 

                    Urine color determination           YELLOW              NRG 

       

 

                    Urine clarity determination           CLEAR               NR

G        

 

                    Urine pH measurement by test strip           7              

     5-9        

 

                    Specific gravity of urine by test strip           1.015     

          1.016-1.022  

      

 

                          Urine protein assay by test strip, semi-quantitative  

         NEGATIVE         

                                        NEGATIVE        

 

                    Urine glucose detection by automated test strip           NE

GATIVE            

NEGATIVE        

 

                          Erythrocytes detection in urine sediment by light micr

oscopy           3+       

                                        NEGATIVE        

 

                    Urine ketones detection by automated test strip           NE

GATIVE            

NEGATIVE        

 

                    Urine nitrite detection by test strip           NEGATIVE    

        NEGATIVE    

    

 

                    Urine total bilirubin detection by test strip           NEGA

TIVE            

NEGATIVE        

 

                          Urine urobilinogen measurement by automated test strip

 (mass/volume)           

NORMAL                                  NORMAL        

 

                    Urine leukocyte esterase detection by dipstick           NEG

ATIVE            

NEGATIVE        

 

                                        Automated urine sediment erythrocyte cou

nt by microscopy (number/high power 

field)                    RARE                      NRG        

 

                                        Automated urine sediment leukocyte count

 by microscopy (number/high power field)

                          RARE                      NRG        

 

                          Bacteria detection in urine sediment by light microsco

py           NEGATIVE     

                                        NRG        

 

                                        Squamous epithelial cells detection in u

rine sediment by light microscopy       

                          10-25                     NRG        

 

                          Crystals detection in urine sediment by light microsco

py           NONE         

                                        NRG        

 

                    Casts detection in urine sediment by light microscopy       

    NONE                

NRG        

 

                          Mucus detection in urine sediment by light microscopy 

          NEGATIVE        

                                        NRG        

 

                    Complete urinalysis with reflex to culture           NO     

             NRG        

 

                                        ESR/SED RATE - 19 10:50         

 

                    SED RATE BY MODIFIED WESTERGREN           2 mm/h            

  < OR = 20        

 

                                        Comprehensive metabolic panel - 20

 15:30         

 

                          Serum or plasma sodium measurement (moles/volume)     

      133 mmol/L          

                                        135-145        

 

                          Serum or plasma potassium measurement (moles/volume)  

         4.0 mmol/L       

                                        3.6-5.0        

 

                          Serum or plasma chloride measurement (moles/volume)   

        95 mmol/L         

                                                

 

                    Carbon dioxide           22 mmol/L           21-32        

 

                          Serum or plasma anion gap determination (moles/volume)

           16 mmol/L      

                                        5-14        

 

                          Serum or plasma urea nitrogen measurement (mass/volume

)           10 mg/dL      

                                        7-18        

 

                          Serum or plasma creatinine measurement (mass/volume)  

         0.53 mg/dL       

                                        0.60-1.30        

 

                    Serum or plasma urea nitrogen/creatinine mass ratio         

  19                  NRG 

       

 

                                        Serum or plasma creatinine measurement w

ith calculation of estimated glomerular 

filtration rate           >                         NRG        

 

                    Serum or plasma glucose measurement (mass/volume)           

475 mg/dL           

        

 

                    Serum or plasma calcium measurement (mass/volume)           

9.3 mg/dL           

8.5-10.1        

 

                          Serum or plasma total bilirubin measurement (mass/volu

me)           0.7 mg/dL   

                                        0.1-1.0        

 

                                        Serum or plasma alkaline phosphatase cory

surement (enzymatic activity/volume)    

                          160 U/L                           

 

                                        Serum or plasma aspartate aminotransfera

se measurement (enzymatic 

activity/volume)           60 U/L                    5-34        

 

                                        Serum or plasma alanine aminotransferase

 measurement (enzymatic activity/volume)

                          94 U/L                    0-55        

 

                    Serum or plasma protein measurement (mass/volume)           

7.0 g/dL            

6.4-8.2        

 

                    Serum or plasma albumin measurement (mass/volume)           

4.5 g/dL            

3.2-4.5        

 

                    CALCIUM CORRECTED           8.9 mg/dL           8.5-10.1    

    

 

                                        Complete blood count (CBC) with automate

d white blood cell (WBC) differential - 

20 15:30         

 

                          Blood leukocytes automated count (number/volume)      

     4.6 10*3/uL          

                                        4.3-11.0        

 

                          Blood erythrocytes automated count (number/volume)    

       4.76 10*6/uL       

                                        4.35-5.85        

 

                    Venous blood hemoglobin measurement (mass/volume)           

14.6 g/dL           

11.5-16.0        

 

                    Blood hematocrit (volume fraction)           39 %           

     35-52        

 

                    Automated erythrocyte mean corpuscular volume           83 [

foz_us]           

80-99        

 

                                        Automated erythrocyte mean corpuscular h

emoglobin (mass per erythrocyte)        

                          31 pg                     25-34        

 

                                        Automated erythrocyte mean corpuscular h

emoglobin concentration measurement 

(mass/volume)             37 g/dL                   32-36        

 

                    Automated erythrocyte distribution width ratio           12.

1 %              10.0-

14.5        

 

                    Automated blood platelet count (count/volume)           242 

10*3/uL           

130-400        

 

                          Automated blood platelet mean volume measurement      

     10.1 [foz_us]        

                                        7.4-10.4        

 

                    Automated blood neutrophils/100 leukocytes           44 %   

             42-75       

 

 

                    Automated blood lymphocytes/100 leukocytes           41 %   

             12-44       

 

 

                    Blood monocytes/100 leukocytes           9 %                

 0-12        

 

                    Automated blood eosinophils/100 leukocytes           5 %    

             0-10        

 

                    Automated blood basophils/100 leukocytes           0 %      

           0-10        

 

                    Blood neutrophils automated count (number/volume)           

2.1 10*3            

1.8-7.8        

 

                    Blood lymphocytes automated count (number/volume)           

1.9 10*3            

1.0-4.0        

 

                    Blood monocytes automated count (number/volume)           0.

4 10*3            

0.0-1.0        

 

                    Automated eosinophil count           0.2 10*3/uL           0

.0-0.3        

 

                    Automated blood basophil count (count/volume)           0.0 

10*3/uL           

0.0-0.1        

 

                                        PT panel in platelet poor plasma by coag

ulation assay - 20 15:30         

 

                          Prothrombin time (PT) in platelet poor plasma by coagu

lation assay           

12.2 s                                  12.2-14.7        

 

                          INR in platelet poor plasma or blood by coagulation as

say           0.9         

                                        0.8-1.4        

 

                                        Activated partial thromboplastin time (a

PTT) in platelet poor plasma 

bycoagulation assay - 20 15:30         

 

                                        Activated partial thromboplastin time (a

PTT) in platelet poor plasma 

bycoagulation assay           22 s                      24-35        

 

                                        Capillary blood glucose measurement by g

lucometer (mass/volume) - 20 18:14

         

 

                          Capillary blood glucose measurement by glucometer (mas

s/volume)           354 

mg/dL                                           

 

                                        TSH w/ FREE T4 - 20 09:40         

 

                    TSH                 2.20 mIU/L           NRG        

 

                    T4, FREE            1.0 ng/dL           0.8-1.8        

 

                                        LIPID PANEL - 20 09:40         

 

                    CHOLESTEROL, TOTAL           182 mg/dL           <200       

 

 

                    HDL CHOLESTEROL           52 mg/dL            > OR = 50     

   

 

                    TRIGLYCERIDES           222 mg/dL           <150        

 

                    LDL-CHOLESTEROL           96 mg/dL (calc)           NRG     

   

 

                    CHOL/HDLC RATIO           3.5 (calc)           <5.0        

 

                    NON HDL CHOLESTEROL           130 mg/dL (calc)           <13

0        

 

                                        MICROALBUMIN/CREATININE RATIO, URINE - 0

20 09:40         

 

                    CREATININE, RANDOM URINE           96 mg/dL            20-27

5        

 

                    MICROALBUMIN           0.4 mg/dL           See Note:        

 

                    MICROALBUMIN/CREATININE RATIO, RANDOM URINE           4 mcg/

mg creat           

<30        

 

                                        CMP - 20 09:40         

 

                    GLUCOSE             TNP mg/dL           NRG        

 

                    UREA NITROGEN (BUN)           17 mg/dL            7-25      

  

 

                    CREATININE           0.75 mg/dL           0.50-1.10        

 

                    eGFR NON-AFR. AMERICAN           97 mL/min/1.73m2           

> OR = 60        

 

                    eGFR            112 mL/min/1.73m2           

> OR = 60        

 

                    BUN/CREATININE RATIO           NOT APPLICABLE (calc)        

   6-22        

 

                    SODIUM              142 mmol/L           135-146        

 

                    POTASSIUM           4.0 mmol/L           3.5-5.3        

 

                    CHLORIDE            101 mmol/L                   

 

                    CARBON DIOXIDE           20 mmol/L           20-32        

 

                    CALCIUM             9.5 mg/dL           8.6-10.2        

 

                    PROTEIN, TOTAL           6.7 g/dL            6.1-8.1        

 

                    ALBUMIN             4.5 g/dL            3.6-5.1        

 

                    GLOBULIN            2.2 g/dL (calc)           1.9-3.7       

 

 

                    ALBUMIN/GLOBULIN RATIO           2.0 (calc)           1.0-2.

5        

 

                    BILIRUBIN, TOTAL           0.8 mg/dL           0.2-1.2      

  

 

                    ALKALINE PHOSPHATASE           90 U/L                 

     

 

                    AST                 24 U/L              10-30        

 

                    ALT                 39 U/L              6-29        

 

                                        CBC w/MANUAL DIFF - 20 09:40      

   

 

                    WHITE BLOOD CELL COUNT           6.7 Thousand/uL           3

.8-10.8        

 

                    RED BLOOD CELL COUNT           4.49 Million/uL           3.8

0-5.10        

 

                    HEMOGLOBIN           13.9 g/dL           11.7-15.5        

 

                    HEMATOCRIT           41.2 %              35.0-45.0        

 

                    MCV                 91.8 fL             80.0-100.0        

 

                    MCH                 31.0 pg             27.0-33.0        

 

                    MCHC                33.7 g/dL           32.0-36.0        

 

                    RDW                 13.2 %              11.0-15.0        

 

                    PLATELET COUNT           245 Thousand/uL           140-400  

      

 

                    MPV                 9.1 fL              7.5-12.5        

 

                    ABSOLUTE NEUTROPHILS           3953 cells/uL           1500-

7800        

 

                    ABSOLUTE MONOCYTES           268 cells/uL           200-950 

       

 

                    ABSOLUTE EOSINOPHILS           67 cells/uL            

       

 

                    ABSOLUTE BASOPHILS           67 cells/uL           0-200    

    

 

                    NEUTROPHILS           59.0 %              NRG        

 

                    LYMPHOCYTES           35.0 %              NRG        

 

                    MONOCYTES           4.0 %               NRG        

 

                    EOSINOPHILS           1.0 %               NRG        

 

                    BASOPHILS           1.0 %               NRG        

 

                    ABSOLUTE LYMPHOCYTES           2345 cells/uL           850-3

900        

 

                    PLATELET ESTIMATION           ADEQUATE            ADEQUATE  

      

 

                    COMMENT(S)                               NRG        

 

                                        SPECIMEN INTEGRITY COMPROMISED - 

0 09:40         

 

                    SPECIMEN INTEGRITY COMPROMISED                              

 NRG        

 

                                        EXTRA URINE SPECIMEN - 20 09:40   

      

 

                    EXTRA URINE SPECIMEN                               NRG      

  

 

                    COMMENT                                 NRG        

 

                                        CULTURE, URINE - 20 14:19         

 

                    CULTURE, URINE, ROUTINE           SEE NOTE            NRG   

     



                                                              



Encounters

      



                ACCT No.           Visit Date/Time           Discharge          

 Status         

             Pt. Type           Provider           Facility           Loc./Unit 

          

Complaint        

 

             [id number]           2020 14:30:00                        [facility]    

       

Out[facility]                                           [facility]     

        

       

 

             [id number]           2020 14:20:00                              

       Document

 Registration                                                                   

 

 

             [id number]           2020 09:00:00                              

       Document

 Registration                                                                   

 

 

             [id number]           2019 10:40:00                              

       Document

 Registration                                                                   

 

 

                    [id number]           2020 15:16:00           

020 18:28:00        

                DIS             Emergency           DAVION WEINSTEIN DO         

  [facility]           ER FS                     ANIMAL BITE IN CHEEK   

     

 

                    [id number]           2018 12:39:00           

018 15:48:00        

                DIS             Emergency           THU ROMAN           [address]

 [facility]           ER                        [place]        

 

                    [id number]           2017 06:00:00           

017 10:35:00        

                DIS             Outpatient           BRANDY KAY DPM      

     [address] 

[facility]                       [place]

S RIGHT FOOT

        

 

                    [id number]           11/15/2017 13:32:00           11/15/2

017 14:00:00        

                DIS             Outpatient           BRANDY KAY DPM      

     [address] 

[facility]           PREOP                     [place]

S RIGHT 

FOOT

## 2020-07-30 NOTE — ED GENERAL
General


Chief Complaint:  Bite-Animal/Human/Insect


Stated Complaint:  WASP STING


Nursing Triage Note:  


Patient reports she was stung by a wasp on her left arm approximately 30-45 


minutes ago. States she has had a previous wasp sting with local swelling. 


Patient sent from walk-in care, states she received epinephrine at walk-in care 


before coming to the ED. Patient states she is having some difficulty 


swallowing, able to manage secretions at this titme.


Nursing Sepsis Screen:  No Definite Risk


Source of Information:  Patient





History of Present Illness


Date Seen by Provider:  2020


Time Seen by Provider:  11:21


Initial Comments


45-year-old female presenting to the emergency department with complaints of 

having a wasp sting. She states that this happened about 30-45 minutes prior to 

arrival. She had initially gone to walk-in clinic and was given a dose of 

epinephrine there. She has an EpiPen but it was . She feels tingling in 

her throat and was concerned that she might be having airway issues. She has no 

wheezing. The sting site at her left elbow is not swelling.





Allergies and Home Medications


Allergies


Coded Allergies:  


     Sulfa (Sulfonamide Antibiotics) (Verified  Allergy, Unknown, RASH, 

11/15/17)


     fluticasone furoate (Verified  Allergy, Unknown, ANAPHYLAXIS, 11/15/17)


     vilanterol (Verified  Allergy, Unknown, ANAPHYLAXIS, 11/15/17)


Uncoded Allergies:  


     wasp (Allergy, Unknown, swelling, 20)





Home Medications


Albuterol Sulfate 1 Puff Puff, 2 PUFF IH Q4H PRN for WHEEZING, (Reported)


   1 PUFF = 90 MCG 


Amoxicillin/Potassium Clav 1 Each Tablet, 1 EACH PO BID, (Reported)


Aspirin 81 Mg Tablet.dr, 81 MG PO HS, (Reported)


Bupropion HCl 100 Mg Tablet.er, 100 MG PO HS, (Reported)


Epinephrine 0.3 Mg/0.3 Ml Auto.injct, 0.3 MG IJ ONCE PRN for allergic reaction


   Prescribed by: JAROCHO ARREDONDO on 20 1316


Escitalopram Oxalate 20 Mg Tablet, 20 MG PO HS, (Reported)


Gabapentin 300 Mg Capsule, 300 MG PO TID, (Reported)


Hydrocodone/Acetaminophen 1 Each Tablet, 1 TAB PO Q4-6 PRN for PAIN


   Prescribed by: MICHELLE HEARN on 17 0935


Ibuprofen 200 Mg Tablet, 600 MG PO HS, (Reported)


   take 3 (200mg) tabs 


Ipratropium/Albuterol Sulfate 3 Ml Ampul.neb, 3 ML IH Q4H PRN for SHORTNESS OF 

BREATH, (Reported)


Melatonin/Pyridoxine 1 Each Tablet, 5 MG PO HS, (Reported)


Omeprazole Magnesium 20 Mg Tablet.dr, 20 MG PO DAILY, (Reported)


Psyllium Husk 0.52 Gm Capsule, 0.52 GM PO HS, (Reported)


Vitamin E Mixed 1,000 Unit Capsule, 1,000 UNIT PO HS, (Reported)





Patient Home Medication List


Home Medication List Reviewed:  Yes





Review of Systems


Review of Systems


Constitutional:  No chills, No fever


EENTM:  hoarseness, other ("tingling" in throat); No mouth swelling, No throat 

swelling


Respiratory:  No dyspnea on exertion, No short of breath, No stridor, No 

wheezing


Cardiovascular:  No chest pain


Gastrointestinal:  no symptoms reported


Genitourinary:  no symptoms reported


Musculoskeletal:  no symptoms reported


Skin:  No rash


Psychiatric/Neurological:  Anxiety


Hematologic/Lymphatic:  No Symptoms Reported





Past Medical-Social-Family Hx


Past Med/Social Hx:  Reviewed Nursing Past Med/Soc Hx


Patient Social History


Recent Foreign Travel:  No


Contact w/Someone Who Travel:  No


Recent Infectious Disease Expo:  No


Recent Hopitalizations:  No





Immunizations Up To Date


Tetanus Booster (TDap):  Less than 5yrs





Seasonal Allergies


Seasonal Allergies:  Yes





Past Medical History


Surgeries:  Yes (OPEN HEART SX FOR HOLES IN HEART AGE 27, BREAST LUMPECTOMY, R 

BUNIONECTOMY )


Hysterectomy


Respiratory:  Yes


Asthma


Cardiac:  Yes (HX OF HOLES IN HEART REPAIRED WHEN 27 YRS OLD, NO ISSUE NOW)


Neurological:  No


GYN History:  Hysterectomy


Gastrointestinal:  No


Musculoskeletal:  Yes (RIGHT FOOT PLANTAR FASCITIS)


Endocrine:  No


Loss of Vision:  Denies


Hearing Impairment:  Denies


Cancer:  No


Psychosocial:  Yes


Depression


Integumentary:  Yes (ANIMAL BITE TO RT KNEE)


Blood Disorders:  No





Physical Exam


Vital Signs





Vital Signs - First Documented








 20





 11:24


 


Temp 36.5


 


Pulse 90


 


Resp 20


 


B/P (MAP) 146/99 (115)


 


Pulse Ox 96


 


O2 Delivery Room Air





Capillary Refill : Less Than 3 Seconds


Height, Weight, BMI


Height: 5'5.00"


Weight: 200lbs. 0.0oz. 90.839248hh; 38.00 BMI


Method:Stated


General Appearance:  Anxious


HEENT:  PERRL/EOMI, Normal ENT Inspection, Pharynx Normal


Neck:  Full Range of Motion, Normal Inspection, Non Tender, Supple


Respiratory:  Chest Non Tender, Lungs Clear, Normal Breath Sounds, No Accessory 

Muscle Use, No Respiratory Distress; No Stridor, No Wheezing


Cardiovascular:  Regular Rate, Rhythm, Normal Peripheral Pulses


Extremity:  Normal Capillary Refill, No Pedal Edema


Neurologic/Psychiatric:  Alert, Oriented x3, No Motor/Sensory Deficits, Normal 

Mood/Affect, CNs II-XII Norm as Tested


Skin:  Normal Color, Warm/Dry





Procedures/Interventions





   Suture Size:  4-0





Progress/Results/Core Measures


Suspected Sepsis


Recent Fever Within 48 Hours:  No


Infection Criteria Present:  None


New/Unexplained  Altered Menta:  No


Sepsis Screen:  No Definite Risk


SIRS


Temperature: 


Pulse: 90 


Respiratory Rate: 20


 


Blood Pressure 146 /99 


Mean: 115





Results/Orders


My Orders





Orders - JAROCHO ARREDONDO MD


Albuterol/Ipra Inhalation Soln (Duoneb I (20 11:30)


O2 (20 11:25)


Ed Iv/Invasive Line Start (20 11:25)


Monitor-Rhythm Ecg Trace Only (20 11:25)


Svn Small Volume Nebulizer (20 11:25)


Methylprednisolone Sod Succ (Solu-Medrol (20 11:25)


Famotidine Injection (Pepcid Injection) (20 11:25)


Diphenhydramine Injection (Benadryl Inje (20 11:30)





Medications Given in ED





Current Medications








 Medications  Dose


 Ordered  Sig/Rukhsana


 Route  Start Time


 Stop Time Status Last Admin


Dose Admin


 


 Albuterol/


 Ipratropium  3 ml  ONCE  ONCE


 INH  20 11:30


 20 11:31 DC 20 11:33


3 ML


 


 Diphenhydramine


 HCl  50 mg  ONCE  ONCE


 IVP  20 11:30


 20 11:31 DC 20 11:34


50 MG








Vital Signs/I&O











 20





 11:24


 


Temp 36.5


 


Pulse 90


 


Resp 20


 


B/P (MAP) 146/99 (115)


 


Pulse Ox 96


 


O2 Delivery Room Air





Capillary Refill : Less Than 3 Seconds








Blood Pressure Mean:                    115








Progress Note #1:  


Progress Note


try IV benadryl, solumedrol, pepcid and duoneb treatment. monitor on cardiac 

monitor. May need additional epinephrine or admit if having worsening breathing 

or airway issues or if not improving.


Progress Note #2:  


   Time:  13:08


Progress Note


On recheck her lungs remain clear and the airway was also clear. She continued 

to have no stridor. She reports that her breathing was improved. She denied he 

wheezing. She felt like she was swallowing and speaking better. She was a little

tired from the Benadryl but otherwise was doing fine. She was requesting to go 

home. We will have her continue a steroid dose for another 2 days. We will 

continue antihistamines as well. Counseled on follow-up and return precautions. 

Refill of EpiPen.





Departure


Impression





   Primary Impression:  


   Accidental wasp sting


Disposition:   HOME, SELF-CARE


Condition:  Improved





Departure-Patient Inst.


Decision time for Depature:  13:11


Referrals:  


RENEA HOLLIDAY MD (PCP/Family)


Primary Care Physician


Patient Instructions:  EPINEPHrine (Systemic), Insect Allergy, Insect Bites and 

Stings (DC)





Add. Discharge Instructions:  


Use the Prednisone you have at home to help continue treating the allergic 

reaction for the Wasp sting. Take 20 mg a day for 2 more days.


This will increase your sugars so monitor that closely and watch your diet 

closely to try and help prevent your sugars from going too high.





Take Pepcid (Famotidine) to help with  histamine effect of the allergic reaction

along with Benadryl or the less sedating options of Zyrtec, Allegra, or 

Claritin.





Refill your EpiPen so that it is available for you if you have another wasp 

sting.





All discharge instructions reviewed with patient and/or family. Voiced 

understanding.


Scripts


Epinephrine (Epipen 2-Ramón) 0.3 Mg/0.3 Ml Auto.injct


0.3 MG IJ ONCE PRN for allergic reaction for 30 Days, #1 PKG 0 Refills


   Prov: JAROCHO ARREDONDO MD         20











JAROCHO ARREDONDO MD               2020 11:31

## 2021-06-14 ENCOUNTER — HOSPITAL ENCOUNTER (EMERGENCY)
Dept: HOSPITAL 75 - ER FS | Age: 46
Discharge: HOME | End: 2021-06-14
Payer: COMMERCIAL

## 2021-06-14 VITALS — WEIGHT: 209.88 LBS | HEIGHT: 61.97 IN | BODY MASS INDEX: 38.62 KG/M2

## 2021-06-14 VITALS — DIASTOLIC BLOOD PRESSURE: 91 MMHG | SYSTOLIC BLOOD PRESSURE: 159 MMHG

## 2021-06-14 DIAGNOSIS — T63.441A: ICD-10-CM

## 2021-06-14 DIAGNOSIS — T78.3XXA: Primary | ICD-10-CM

## 2021-06-14 DIAGNOSIS — F32.9: ICD-10-CM

## 2021-06-14 DIAGNOSIS — Z79.52: ICD-10-CM

## 2021-06-14 DIAGNOSIS — E11.65: ICD-10-CM

## 2021-06-14 DIAGNOSIS — Z79.82: ICD-10-CM

## 2021-06-14 DIAGNOSIS — Z79.899: ICD-10-CM

## 2021-06-14 DIAGNOSIS — E66.9: ICD-10-CM

## 2021-06-14 DIAGNOSIS — J45.909: ICD-10-CM

## 2021-06-14 LAB
ALBUMIN SERPL-MCNC: 4.9 GM/DL (ref 3.2–4.5)
ALP SERPL-CCNC: 94 U/L (ref 40–136)
ALT SERPL-CCNC: 50 U/L (ref 0–55)
BASOPHILS # BLD AUTO: 0.1 10^3/UL (ref 0–0.1)
BASOPHILS NFR BLD AUTO: 0 % (ref 0–10)
BILIRUB SERPL-MCNC: 0.6 MG/DL (ref 0.1–1)
BUN/CREAT SERPL: 23
CALCIUM SERPL-MCNC: 9.8 MG/DL (ref 8.5–10.1)
CHLORIDE SERPL-SCNC: 99 MMOL/L (ref 98–107)
CO2 SERPL-SCNC: 25 MMOL/L (ref 21–32)
CREAT SERPL-MCNC: 0.78 MG/DL (ref 0.6–1.3)
EOSINOPHIL # BLD AUTO: 0.1 10^3/UL (ref 0–0.3)
EOSINOPHIL NFR BLD AUTO: 1 % (ref 0–10)
GFR SERPLBLD BASED ON 1.73 SQ M-ARVRAT: > 60 ML/MIN
GLUCOSE SERPL-MCNC: 199 MG/DL (ref 70–105)
HCT VFR BLD CALC: 42 % (ref 35–52)
HGB BLD-MCNC: 14.9 G/DL (ref 11.5–16)
LYMPHOCYTES # BLD AUTO: 4 X 10^3 (ref 1–4)
LYMPHOCYTES NFR BLD AUTO: 32 % (ref 12–44)
MANUAL DIFFERENTIAL PERFORMED BLD QL: NO
MCH RBC QN AUTO: 31 PG (ref 25–34)
MCHC RBC AUTO-ENTMCNC: 36 G/DL (ref 32–36)
MCV RBC AUTO: 86 FL (ref 80–99)
MONOCYTES # BLD AUTO: 1 X 10^3 (ref 0–1)
MONOCYTES NFR BLD AUTO: 8 % (ref 0–12)
NEUTROPHILS # BLD AUTO: 7.4 X 10^3 (ref 1.8–7.8)
NEUTROPHILS NFR BLD AUTO: 59 % (ref 42–75)
PLATELET # BLD: 354 10^3/UL (ref 130–400)
PMV BLD AUTO: 9.5 FL (ref 7.4–10.4)
POTASSIUM SERPL-SCNC: 4.1 MMOL/L (ref 3.6–5)
PROT SERPL-MCNC: 7.4 GM/DL (ref 6.4–8.2)
SODIUM SERPL-SCNC: 136 MMOL/L (ref 135–145)
WBC # BLD AUTO: 12.6 10^3/UL (ref 4.3–11)

## 2021-06-14 PROCEDURE — 80053 COMPREHEN METABOLIC PANEL: CPT

## 2021-06-14 PROCEDURE — 36415 COLL VENOUS BLD VENIPUNCTURE: CPT

## 2021-06-14 PROCEDURE — 85025 COMPLETE CBC W/AUTO DIFF WBC: CPT

## 2021-06-14 NOTE — ED GENERAL
General


Chief Complaint:  Allergic Reaction


Stated Complaint:  TONGUE SWELLING; BEE STING


Source of Information:  Patient, Other (friend)





History of Present Illness


Date Seen by Provider:  Jun 14, 2021


Time Seen by Provider:  14:07


Initial Comments


45-year-old female presenting by private vehicle after having a bee sting to her

left shoulder.  She had used her EpiPen but was still having swelling to her 

face and throat.  She was started on trouble with her breathing.  She called a 

friend to help bring her to the emergency department rather than calling 911. 

She is having trouble speaking due to the swelling. This happened about 30-45 

minutes pta.


Timing/Duration:  1/2 Hour


Severity:  Severe


Associated Systoms:  No Chest Pain; Cough; No Diaphoresis, No Fever/Chills, No 

Headaches, No Malaise, No Nausea/Vomiting, No Seizure; Shortness of Air; No 

Syncope, No Weakness





Allergies and Home Medications


Allergies


Coded Allergies:  


     Sulfa (Sulfonamide Antibiotics) (Verified  Allergy, Unknown, RASH, 

11/15/17)


     fluticasone furoate (Verified  Allergy, Unknown, ANAPHYLAXIS, 11/15/17)


     vilanterol (Verified  Allergy, Unknown, ANAPHYLAXIS, 11/15/17)


Uncoded Allergies:  


     wasp (Allergy, Unknown, swelling, 7/30/20)





Home Medications


Albuterol Sulfate 1 Puff Puff, 2 PUFF IH Q4H PRN for WHEEZING, (Reported)


   1 PUFF = 90 MCG 


Amoxicillin/Potassium Clav 1 Each Tablet, 1 EACH PO BID, (Reported)


Aspirin 81 Mg Tablet.dr, 81 MG PO HS, (Reported)


Bupropion HCl 100 Mg Tablet.er, 100 MG PO HS, (Reported)


Epinephrine 0.3 Mg/0.3 Ml Auto.injct, 0.3 MG IJ ONCE PRN for allergic reaction


   Prescribed by: JAROCHO ARREDONDO on 6/14/21 1543


Escitalopram Oxalate 20 Mg Tablet, 20 MG PO HS, (Reported)


Gabapentin 300 Mg Capsule, 300 MG PO TID, (Reported)


Hydrocodone/Acetaminophen 1 Each Tablet, 1 TAB PO Q4-6 PRN for PAIN


   Prescribed by: MICHELLE HEARN on 11/17/17 0935


Ibuprofen 200 Mg Tablet, 600 MG PO HS, (Reported)


   take 3 (200mg) tabs 


Ipratropium/Albuterol Sulfate 3 Ml Ampul.neb, 3 ML IH Q4H PRN for SHORTNESS OF 

BREATH, (Reported)


Melatonin/Pyridoxine 1 Each Tablet, 5 MG PO HS, (Reported)


Omeprazole Magnesium 20 Mg Tablet.dr, 20 MG PO DAILY, (Reported)


Prednisone 20 Mg Tab, 40 MG PO DAILY


   Prescribed by: JAROCHO ARREDONDO on 6/14/21 1543


Psyllium Husk 0.52 Gm Capsule, 0.52 GM PO HS, (Reported)


Vitamin E Mixed 1,000 Unit Capsule, 1,000 UNIT PO HS, (Reported)





Patient Home Medication List


Home Medication List Reviewed:  Yes





Review of Systems


Review of Systems


Constitutional:  No chills, No fever


EENTM:  mouth swelling, throat swelling, other (tongue and throat swelling)


Respiratory:  cough


Cardiovascular:  no symptoms reported


Gastrointestinal:  no symptoms reported


Genitourinary:  no symptoms reported


Musculoskeletal:  no symptoms reported


Skin:  other (swelling to face, throat)


Psychiatric/Neurological:  Anxiety


Immunological/Allergic:  see HPI





Past Medical-Social-Family Hx


Past Med/Social Hx:  Reviewed Nursing Past Med/Soc Hx


Patient Social History


Alcohol Use:  Occasionally Uses


2nd Hand Smoke Exposure:  No


Recent Hopitalizations:  No





Immunizations Up To Date


Tetanus Booster (TDap):  Less than 5yrs





Seasonal Allergies


Seasonal Allergies:  Yes





Past Medical History


Surgeries:  Yes (OPEN HEART SX FOR HOLES IN HEART AGE 27, BREAST LUMPECTOMY, R 

BUNIONECTOMY )


Hysterectomy


Respiratory:  Yes


Asthma


Cardiac:  Yes (HX OF HOLES IN HEART REPAIRED WHEN 27 YRS OLD, NO ISSUE NOW)


Neurological:  No


GYN History:  Hysterectomy


Genitourinary:  No


Gastrointestinal:  No


Musculoskeletal:  Yes (RIGHT FOOT PLANTAR FASCITIS)


Endocrine:  No


HEENT:  No


Loss of Vision:  Denies


Hearing Impairment:  Denies


Cancer:  No


Psychosocial:  Yes


Depression


Integumentary:  Yes (ANIMAL BITE TO RT KNEE)


Blood Disorders:  No





Physical Exam


Vital Signs





Vital Signs - First Documented








 6/14/21 6/14/21





 14:11 16:48


 


Temp 36.7 


 


Pulse 168 


 


Resp 24 


 


B/P (MAP) 164/97 (119) 


 


Pulse Ox  95


 


O2 Delivery Room Air 





Capillary Refill :


Height, Weight, BMI


Height: 5'5.00"


Weight: 200lbs. 0.0oz. [id number]; 38.00 BMI


Method:Stated


General Appearance:  Anxious, Obese


HEENT:  PERRL/EOMI, Moist Mucous Membranes, Other (swelling to tongue, 

face/throat. trouble speaking due to swelling. handling her own secretions 

still)


Neck:  Non Tender, Other (swelling to throat and neck)


Respiratory:  Chest Non Tender, Decreased Breath Sounds


Cardiovascular:  Normal Peripheral Pulses, Tachycardia


Gastrointestinal:  No Pulsatile Mass, Non Tender, Soft


Extremity:  Normal Capillary Refill


Neurologic/Psychiatric:  Alert


Skin:  Warm/Dry, Erythema





Procedures/Interventions





   Suture Size:  4-0





Progress/Results/Core Measures


Suspected Sepsis


SIRS


Temperature: 


Pulse:  


Respiratory Rate: 


 


Laboratory Tests


6/14/21 14:13: White Blood Count 12.6H


Blood Pressure  / 


Mean: 


 





Laboratory Tests


6/14/21 14:13: 


Creatinine 0.78, Platelet Count 354, Total Bilirubin 0.6








Results/Orders


Lab Results





Laboratory Tests








Test


 6/14/21


14:13 Range/Units


 


 


White Blood Count


 12.6 H


 4.3-11.0


10^3/uL


 


Red Blood Count


 4.83 


 4.35-5.85


10^6/uL


 


Hemoglobin 14.9  11.5-16.0  G/DL


 


Hematocrit 42  35-52  %


 


Mean Corpuscular Volume 86  80-99  FL


 


Mean Corpuscular Hemoglobin 31  25-34  PG


 


Mean Corpuscular Hemoglobin


Concent 36 


 32-36  G/DL





 


Red Cell Distribution Width 12.9  10.0-14.5  %


 


Platelet Count


 354 


 130-400


10^3/uL


 


Mean Platelet Volume 9.5  7.4-10.4  FL


 


Immature Granulocyte % (Auto) 1   %


 


Neutrophils (%) (Auto) 59  42-75  %


 


Lymphocytes (%) (Auto) 32  12-44  %


 


Monocytes (%) (Auto) 8  0-12  %


 


Eosinophils (%) (Auto) 1  0-10  %


 


Basophils (%) (Auto) 0  0-10  %


 


Neutrophils # (Auto) 7.4  1.8-7.8  X 10^3


 


Lymphocytes # (Auto) 4.0  1.0-4.0  X 10^3


 


Monocytes # (Auto) 1.0  0.0-1.0  X 10^3


 


Eosinophils # (Auto)


 0.1 


 0.0-0.3


10^3/uL


 


Basophils # (Auto)


 0.1 


 0.0-0.1


10^3/uL


 


Immature Granulocyte # (Auto)


 0.1 


 0.0-0.1


10^3/uL


 


Sodium Level 136  135-145  MMOL/L


 


Potassium Level 4.1  3.6-5.0  MMOL/L


 


Chloride Level 99    MMOL/L


 


Carbon Dioxide Level 25  21-32  MMOL/L


 


Anion Gap 12  5-14  MMOL/L


 


Blood Urea Nitrogen 18  7-18  MG/DL


 


Creatinine


 0.78 


 0.60-1.30


MG/DL


 


Estimat Glomerular Filtration


Rate > 60 


  





 


BUN/Creatinine Ratio 23   


 


Glucose Level 199 H   MG/DL


 


Calcium Level 9.8  8.5-10.1  MG/DL


 


Corrected Calcium   8.5-10.1  MG/DL


 


Total Bilirubin 0.6  0.1-1.0  MG/DL


 


Aspartate Amino Transf


(AST/SGOT) 32 


 5-34  U/L





 


Alanine Aminotransferase


(ALT/SGPT) 50 


 0-55  U/L





 


Alkaline Phosphatase 94    U/L


 


Total Protein 7.4  6.4-8.2  GM/DL


 


Albumin 4.9 H 3.2-4.5  GM/DL








My Orders





Orders - JAROCHO ARREDONDO MD


Comprehensive Metabolic Panel (6/14/21 14:13)


Ed Iv/Invasive Line Start (6/14/21 14:13)


Cbc With Automated Diff (6/14/21 14:13)


Methylprednisolone Sod Succ (Solu-Medrol (6/14/21 14:13)


Diphenhydramine Injection (Benadryl Inje (6/14/21 14:13)


Famotidine Injection (Pepcid Injection) (6/14/21 14:13)


Epinephrine  1 Mg Injection (Adrenalin I (6/14/21 14:13)





Vital Signs/I&O











 6/14/21 6/14/21





 14:11 16:48


 


Temp 36.7 36.7


 


Pulse 168 94


 


Resp 24 20


 


B/P (MAP) 164/97 (119) 159/91 (119)


 


Pulse Ox  95


 


O2 Delivery Room Air Room Air





Capillary Refill :


Progress Note #1:  


Progress Note


place on cardiac telemetry monitor. start IV and administer solumedrol 125 mg, 

famotidine 20 mg, diphenhydramine 50 mg, and repeat epinephrine 0.3 mg IM.


Progress Note #2:  


   Time:  15:20


Progress Note


Labs stable with elevated glucose 190. She has improved swelling and is 

swallowing better. she is able to speak now and feels improved. She still 

reports some mild "weird sensation" in her throat. Will monitor an additional 

45-60 minutes and if continues to improve and stay stable will discharge to home

on 3 days prednisone and refill of Epi-pen.  on follow up and return 

precautions.


Progress Note #3:  


   Time:  16:30


Progress Note


Patient was still feeling like her swallowing and breathing was doing better.  

She is still speaking clearly.  She is complaining of feeling a little dizzy and

lightheaded.  Her oxygen saturation and vital signs all remained stable with a 

O2 sat around 96% on room air.  When she checked her device to see what her 

blood sugar was running it was at 278.  She also stated she had not eaten a full

meal since this morning and had only had apple at lunchtime.  This certainly 

could be contributing to her symptoms still addition to the steroid, Benadryl, 

epinephrine.  Stressed the importance of having a protein and fiber-containing 

meal.  Advised to stay closer to [place] for tonight and stay with a friend 

so that if she has worsening symptoms she could call 911 and come back 

immediately so that she could be admitted for things for worsening symptoms.  

Refill of the EpiPen and 3-day course of steroid burst was sent in.  She was to 

start Ozempic for blood sugar control as well and was planning on starting 

Wednesday and I advised her to go ahead and start that today.





Departure


Impression





   Primary Impression:  


   Allergic angioedema


   Qualified Codes:  T78.3XXA - Angioneurotic edema, initial encounter


   Additional Impressions:  


   Bee sting-induced anaphylaxis


   Qualified Codes:  T63.441A - Toxic effect of venom of bees, accidental 

   (unintentional), initial encounter


   Hyperglycemia due to diabetes mellitus


Disposition:  01 HOME, SELF-CARE


Condition:  Improved





Departure-Patient Inst.


Decision time for Depature:  16:37


Referrals:  


RENEA HOLLIDAY MD (PCP/Family)


Primary Care Physician


Patient Instructions:  Insect Bites and Stings ED, Allergic Reaction ED





Add. Discharge Instructions:  


If swelling worsens or having trouble swallowing or trouble breathing then call 

911 as you would need additional medicine and admit to the hospital to treat 

your allergic reaction.





In the future if you have another episode with a sting then use your Epi-Pen but

you could also take Benadryl 50 mg to help with symptoms. Call 911 and be seen 

right away.  The faster you get medicine for your allergic reaction, the lower 

the chance for you to die from a severe allergic reaction.





You could take Benadryl (Diphenhydramine) 25 to 50 mg every 4 hours along with 

the steroid to help with swelling and itching. 





Start your Ozempic today to help manage the elevated blood sugars.


Eat a meal with fiber and protein when you leave here to help stabilize your 

sugars 





All discharge instructions reviewed with patient and/or family. Voiced 

understanding.


Scripts


Prednisone (Prednisone) 20 Mg Tab


40 MG PO DAILY for allergic reaction for 3 Days, #6 TAB 0 Refills


   Prov: JAROCHO ARREDONDO MD         6/14/21 


Epinephrine (Epipen 2-Ramón) 0.3 Mg/0.3 Ml Auto.injct


0.3 MG IJ ONCE PRN for allergic reaction for 30 Days, #1 PKG 0 Refills


   Prov: JAROCHO ARREDONDO MD         6/14/21











JAROCHO ARREDONDO MD               Jun 14, 2021 14:22

## 2022-11-03 ENCOUNTER — HOSPITAL ENCOUNTER (OUTPATIENT)
Dept: HOSPITAL 75 - RAD FS | Age: 47
End: 2022-11-03
Attending: NURSE PRACTITIONER
Payer: SELF-PAY

## 2022-11-03 DIAGNOSIS — E78.5: Primary | ICD-10-CM

## 2022-11-03 PROCEDURE — 75571 CT HRT W/O DYE W/CA TEST: CPT

## 2022-11-03 NOTE — DIAGNOSTIC IMAGING REPORT
EXAMINATION: CT calcium scoring without contrast.



TECHNIQUE: Multiple contiguous axial images were obtained through

the chest without the use of intravenous contrast for purposes of

calcium scoring. All CT scans use one or more of the following

dose optimizing techniques: automated exposure control, MA and/or

KvP adjustment based on patient size and exam type or iterative

reconstruction. 



HISTORY: Hyperlipidemia



COMPARISON: None available.



FINDINGS: 



The calculated coronary artery calcium score is 0.



There is no edema or pneumonia. No pleural effusion. No

pneumothorax. No suspicious nodules.



Heart size is normal. No pericardial effusion. Aorta is normal in

caliber. There is no axillary or supraclavicular lymphadenopathy.

There is no mediastinal lymphadenopathy.



Limited views of the upper abdomen are unremarkable.



There are no suspicious osseus lesions.



IMPRESSION:



1. Calculated coronary artery calcium score of 0. Please note

that the computer software erroneously identified areas of streak

artifact as calcium. There is no coronary artery calcium.



Dictated by: 



  Dictated on workstation # [id number]